# Patient Record
Sex: FEMALE | Race: WHITE | Employment: PART TIME | URBAN - METROPOLITAN AREA
[De-identification: names, ages, dates, MRNs, and addresses within clinical notes are randomized per-mention and may not be internally consistent; named-entity substitution may affect disease eponyms.]

---

## 2024-02-25 ENCOUNTER — HOSPITAL ENCOUNTER (EMERGENCY)
Age: 19
Discharge: HOME | End: 2024-02-25
Payer: COMMERCIAL

## 2024-02-25 VITALS
DIASTOLIC BLOOD PRESSURE: 67 MMHG | HEIGHT: 63 IN | TEMPERATURE: 98 F | OXYGEN SATURATION: 97 % | RESPIRATION RATE: 18 BRPM | SYSTOLIC BLOOD PRESSURE: 109 MMHG | HEART RATE: 82 BPM

## 2024-02-25 DIAGNOSIS — L50.9 URTICARIAL RASH: Primary | ICD-10-CM

## 2024-02-25 PROCEDURE — 99283 EMERGENCY DEPT VISIT LOW MDM: CPT

## 2024-02-25 PROCEDURE — 6370000000 HC RX 637 (ALT 250 FOR IP): Performed by: NURSE PRACTITIONER

## 2024-02-25 PROCEDURE — 6360000002 HC RX W HCPCS: Performed by: NURSE PRACTITIONER

## 2024-02-25 RX ORDER — DIPHENHYDRAMINE HCL 25 MG
25 CAPSULE ORAL EVERY 6 HOURS PRN
Qty: 20 CAPSULE | Refills: 0 | Status: SHIPPED | OUTPATIENT
Start: 2024-02-25 | End: 2024-03-06

## 2024-02-25 RX ORDER — FAMOTIDINE 20 MG/1
20 TABLET, FILM COATED ORAL ONCE
Status: COMPLETED | OUTPATIENT
Start: 2024-02-25 | End: 2024-02-25

## 2024-02-25 RX ORDER — DEXAMETHASONE SODIUM PHOSPHATE 10 MG/ML
8 INJECTION INTRAMUSCULAR; INTRAVENOUS ONCE
Status: COMPLETED | OUTPATIENT
Start: 2024-02-25 | End: 2024-02-25

## 2024-02-25 RX ORDER — FAMOTIDINE 20 MG/1
20 TABLET, FILM COATED ORAL 2 TIMES DAILY
Qty: 60 TABLET | Refills: 0 | Status: SHIPPED | OUTPATIENT
Start: 2024-02-25

## 2024-02-25 RX ORDER — METHYLPREDNISOLONE 4 MG/1
TABLET ORAL
Qty: 1 KIT | Refills: 0 | Status: SHIPPED | OUTPATIENT
Start: 2024-02-25 | End: 2024-03-02

## 2024-02-25 RX ADMIN — DEXAMETHASONE SODIUM PHOSPHATE 8 MG: 10 INJECTION INTRAMUSCULAR; INTRAVENOUS at 21:31

## 2024-02-25 RX ADMIN — FAMOTIDINE 20 MG: 20 TABLET, FILM COATED ORAL at 21:31

## 2024-02-25 ASSESSMENT — LIFESTYLE VARIABLES
HOW MANY STANDARD DRINKS CONTAINING ALCOHOL DO YOU HAVE ON A TYPICAL DAY: PATIENT DOES NOT DRINK
HOW OFTEN DO YOU HAVE A DRINK CONTAINING ALCOHOL: NEVER

## 2024-02-27 NOTE — ED PROVIDER NOTES
DIAGNOSIS/MDM:   Vitals:    Vitals:    02/25/24 2039   BP: 109/67   Pulse: 82   Resp: 18   Temp: 98 °F (36.7 °C)   TempSrc: Oral   SpO2: 97%   Height: 1.6 m (5' 3\")       Patient was given the following medications:  Medications   dexAMETHasone (DECADRON) Oral 8 mg (8 mg Oral Given 2/25/24 2131)   famotidine (PEPCID) tablet 20 mg (20 mg Oral Given 2/25/24 2131)                 CONSULTS: (Who and What was discussed)  None    Discussion with Other Profesionals : None    Social Determinants Significantly Affecting Health : None    Records Reviewed External : None    CC/HPI Summary, DDx, ED Course, and Reassessment: 18-year-old femalewho presents to the ED for a rash.  Patient states that she began to have hives and itching 1 day ago.  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  Patient states that she been taking Benadryl which has been helping her symptoms.  Patient denies any shortness of breath or chest pain she denies any numbness tingling or weakness of bilateral upper extremities.  Patient Nuys any tongue swelling uvula swelling.  Patient denies any fevers or chills.  Differential diagnosis includes urticarial rash, contact dermatitis, exposure to irritant today review.  Patient at this time did have improvement after a dose of Pepcid and steroids in the ED.  Patient at this time although declined IM or IV medications as they would work faster for her symptoms.  Patient was only given p.o. medications.  Patient was placed on p.o. steroids for home as well as Pepcid and Benadryl.  Patient at this time is hemodynamically stable she is neurologically vascular mostly intact and stable for discharge home with close outpatient follow-up and strict return precautions.          I am the Primary Clinician of Record.    FINAL IMPRESSION      1.

## 2024-04-09 ENCOUNTER — OFFICE VISIT (OUTPATIENT)
Dept: ENT CLINIC | Age: 19
End: 2024-04-09
Payer: COMMERCIAL

## 2024-04-09 VITALS
HEART RATE: 73 BPM | DIASTOLIC BLOOD PRESSURE: 67 MMHG | SYSTOLIC BLOOD PRESSURE: 109 MMHG | WEIGHT: 115.1 LBS | BODY MASS INDEX: 20.39 KG/M2

## 2024-04-09 DIAGNOSIS — J35.8 TONSILLOLITH: ICD-10-CM

## 2024-04-09 DIAGNOSIS — H69.93 EUSTACHIAN TUBE DYSFUNCTION, BILATERAL: Primary | ICD-10-CM

## 2024-04-09 PROCEDURE — G8420 CALC BMI NORM PARAMETERS: HCPCS | Performed by: OTOLARYNGOLOGY

## 2024-04-09 PROCEDURE — G8427 DOCREV CUR MEDS BY ELIG CLIN: HCPCS | Performed by: OTOLARYNGOLOGY

## 2024-04-09 PROCEDURE — 99203 OFFICE O/P NEW LOW 30 MIN: CPT | Performed by: OTOLARYNGOLOGY

## 2024-04-09 PROCEDURE — 1036F TOBACCO NON-USER: CPT | Performed by: OTOLARYNGOLOGY

## 2024-04-09 RX ORDER — CHLORHEXIDINE GLUCONATE ORAL RINSE 1.2 MG/ML
15 SOLUTION DENTAL 2 TIMES DAILY
Qty: 420 ML | Refills: 0 | Status: SHIPPED | OUTPATIENT
Start: 2024-04-09 | End: 2024-04-23

## 2024-04-09 RX ORDER — FLUTICASONE PROPIONATE 50 MCG
2 SPRAY, SUSPENSION (ML) NASAL DAILY
Qty: 1 EACH | Refills: 2 | Status: SHIPPED | OUTPATIENT
Start: 2024-04-09

## 2024-04-09 ASSESSMENT — ENCOUNTER SYMPTOMS
EYE ITCHING: 0
COUGH: 0
ABDOMINAL PAIN: 0
SINUS PAIN: 0
SHORTNESS OF BREATH: 0

## 2024-04-09 NOTE — PATIENT INSTRUCTIONS
When you fly, use over the counter Afrin 1 hour before the flight, and, then, use it again 1 hour before the flight on the way home. Chew gum also.

## 2024-04-09 NOTE — PROGRESS NOTES
Department of Otolaryngology  Office Consult Note  24          Subjective:        Chief Complaint:  had concerns including Follow-up (Pt states she has pressure in her ears when driving and on planes and states she can not pop them. States she gets tonsil stones every time she gets sick, they get bad and she has to go to the doctors ).     Patient ID: Sumit Garcia is a 18 y.o. female.    HPI: Patient presents as  new patient for pain in bilateral ears during change in elevation. Pt states she has noticed this since birth. Pt denies any recent changes to the pain. Last episode was in July while flying. Pt describes the pain as a pressure at times that will progress into a sharp stabbing pain and during will have  hearing. At its worst pt states pain is 8/10. Has attempted to chew gum, swallow water, eat but with no relief. Pt states the pain slowly decreases throughout the day on its own.     Pt states hx of bilateral ear tubes without complications    Pt also c/o recurrent tonsil stones with most recent episode in . Pt states they are normally brought on by upper respiratory illness. Pt states she will get bilateral tonsil stones that are painful and will normally need treated with steroids.     Review of Systems   Constitutional:  Negative for chills, fatigue and fever.   HENT:  Negative for congestion, ear discharge, ear pain, hearing loss, rhinorrhea and sinus pain.    Eyes:  Negative for itching.   Respiratory:  Negative for cough and shortness of breath.    Cardiovascular:  Negative for chest pain and palpitations.   Gastrointestinal:  Negative for abdominal pain and vomiting.   Skin:  Negative for rash.   Allergic/Immunologic: Negative for environmental allergies.   Neurological:  Negative for dizziness and headaches.   Hematological:  Does not bruise/bleed easily.   All other systems reviewed and are negative.        History reviewed. No pertinent past medical history.  Past Surgical

## 2024-04-11 ASSESSMENT — ENCOUNTER SYMPTOMS: VOMITING: 0

## 2024-05-24 ENCOUNTER — OFFICE VISIT (OUTPATIENT)
Dept: ENT CLINIC | Age: 19
End: 2024-05-24
Payer: COMMERCIAL

## 2024-05-24 VITALS
WEIGHT: 114.3 LBS | DIASTOLIC BLOOD PRESSURE: 75 MMHG | SYSTOLIC BLOOD PRESSURE: 133 MMHG | HEIGHT: 66 IN | HEART RATE: 76 BPM | BODY MASS INDEX: 18.37 KG/M2

## 2024-05-24 DIAGNOSIS — H69.93 EUSTACHIAN TUBE DYSFUNCTION, BILATERAL: ICD-10-CM

## 2024-05-24 DIAGNOSIS — J35.8 TONSILLOLITH: Primary | ICD-10-CM

## 2024-05-24 PROCEDURE — G8427 DOCREV CUR MEDS BY ELIG CLIN: HCPCS | Performed by: OTOLARYNGOLOGY

## 2024-05-24 PROCEDURE — 99213 OFFICE O/P EST LOW 20 MIN: CPT | Performed by: OTOLARYNGOLOGY

## 2024-05-24 PROCEDURE — G8420 CALC BMI NORM PARAMETERS: HCPCS | Performed by: OTOLARYNGOLOGY

## 2024-05-24 PROCEDURE — 1036F TOBACCO NON-USER: CPT | Performed by: OTOLARYNGOLOGY

## 2024-05-24 NOTE — PROGRESS NOTES
[facility]  KAI PereiraO. Ms.Ed        Patient Name:  Sumit Garcia  :  2005     CHIEF C/O:    Chief Complaint   Patient presents with    Follow-up     6 week follow up Afrin patient states that she notices symptoms occur more with flying and driving with variations of elevation patient states that she currently does not have any symptoms       HISTORY OBTAINED FROM:  patient    HISTORY OF PRESENT ILLNESS:       Sumit is a 18 y.o. year old female, here today for follow up of:       Patient is seen and examined, here for follow-up for chronic complaints x 2.  First complaint is a history of tonsil is sore throats intermittently with out any recent active strep pharyngitis or bacterial pharyngitis treatments.  Patient states on Peridex which and spits as well as nonalcoholic oral mouth rinse that she has had a decreased episodes of sore throat and tonsil loss.    Patient also has a history of chronic allergic rhinitis eustachian tube dysfunction with genuine concern about changes in elevation such as traveling over mountains or hills, as well as flights.  She was given recommendations for flight risk prevention including daily Flonase as well as Afrin 30 minutes of 4 flight leaves and 30 minutes prior to the landing.  No other new complaints today.           History reviewed. No pertinent past medical history.  Past Surgical History:   Procedure Laterality Date    MYRINGOTOMY W/ TUBES      age 1, they fell out       Current Outpatient Medications:     fluticasone (FLONASE) 50 MCG/ACT nasal spray, 2 sprays by Nasal route daily 2 sprays each nostril once daily, Disp: 1 each, Rfl: 2  Patient has no known allergies.  Social History     Tobacco Use    Smoking status: Never    Smokeless tobacco: Never   Vaping Use    Vaping Use: Never used   Substance Use Topics    Alcohol use: Never    Drug use: Never     No family history on file.    Review of Systems   Constitutional:  Negative for